# Patient Record
Sex: FEMALE | Race: WHITE | NOT HISPANIC OR LATINO | Employment: FULL TIME | URBAN - METROPOLITAN AREA
[De-identification: names, ages, dates, MRNs, and addresses within clinical notes are randomized per-mention and may not be internally consistent; named-entity substitution may affect disease eponyms.]

---

## 2020-04-29 RX ORDER — BUSPIRONE HYDROCHLORIDE 7.5 MG/1
7.5 TABLET ORAL 3 TIMES DAILY
COMMUNITY

## 2020-04-29 RX ORDER — TIZANIDINE 4 MG/1
4 TABLET ORAL NIGHTLY PRN
COMMUNITY

## 2020-04-29 RX ORDER — MONTELUKAST SODIUM 10 MG/1
10 TABLET ORAL NIGHTLY
COMMUNITY

## 2020-04-29 RX ORDER — TRAMADOL HYDROCHLORIDE 50 MG/1
50 TABLET ORAL EVERY 6 HOURS PRN
COMMUNITY
End: 2020-04-30

## 2020-04-29 RX ORDER — IBUPROFEN 800 MG/1
800 TABLET ORAL EVERY 6 HOURS PRN
COMMUNITY

## 2020-04-29 RX ORDER — HYDROCHLOROTHIAZIDE 25 MG/1
25 TABLET ORAL DAILY
COMMUNITY

## 2020-04-30 ENCOUNTER — OFFICE VISIT (OUTPATIENT)
Dept: NEUROSURGERY | Facility: CLINIC | Age: 40
End: 2020-04-30

## 2020-04-30 VITALS — WEIGHT: 188.8 LBS | RESPIRATION RATE: 15 BRPM | BODY MASS INDEX: 28.61 KG/M2 | TEMPERATURE: 96 F | HEIGHT: 68 IN

## 2020-04-30 DIAGNOSIS — M54.16 LUMBAR RADICULOPATHY: Primary | ICD-10-CM

## 2020-04-30 PROCEDURE — 99204 OFFICE O/P NEW MOD 45 MIN: CPT | Performed by: NEUROLOGICAL SURGERY

## 2020-04-30 NOTE — PROGRESS NOTES
NAME: SANAZ VELEZ   DOS: 2020  : 1980  PCP: Muna Yanes APRN    Chief Complaint:    Chief Complaint   Patient presents with   • Low back & left hip pain       History of Present Illness:  40 y.o. female   I saw this 40-year-old female in neurosurgical consultation she reports a history of back issues after the birth of her first child some 14 years ago she has pain in her buttock hip and left leg and went to the emergency room and followed up with her primary care doctor she is a daily smoker she has pain that radiates into her foot gets numb she denies any bowel bladder incontinence issues or new symptoms of cauda equina syndrome she is been to the chiropractor and is here for evaluation    PMHX  Allergies:  No Known Allergies  Medications    Current Outpatient Medications:   •  busPIRone (BUSPAR) 7.5 MG tablet, Take 7.5 mg by mouth 3 (Three) Times a Day., Disp: , Rfl:   •  hydroCHLOROthiazide (HYDRODIURIL) 25 MG tablet, Take 25 mg by mouth Daily., Disp: , Rfl:   •  ibuprofen (ADVIL,MOTRIN) 800 MG tablet, Take 800 mg by mouth Every 6 (Six) Hours As Needed for Mild Pain ., Disp: , Rfl:   •  montelukast (SINGULAIR) 10 MG tablet, Take 10 mg by mouth Every Night., Disp: , Rfl:   •  tiZANidine (ZANAFLEX) 4 MG tablet, Take 4 mg by mouth At Night As Needed for Muscle Spasms., Disp: , Rfl:   Past Medical History:  Past Medical History:   Diagnosis Date   • Cancer (CMS/HCC)     Ovarian cancer     Past Surgical History:  Past Surgical History:   Procedure Laterality Date   • HYSTERECTOMY           Social Hx:  Social History     Tobacco Use   • Smoking status: Not on file   Substance Use Topics   • Alcohol use: Not on file   • Drug use: Never     Family Hx:  History reviewed. No pertinent family history.  Review of Systems:        Review of Systems   Constitutional: Negative for activity change, appetite change, chills, diaphoresis, fatigue, fever and unexpected weight change.   HENT:  Negative for congestion, dental problem, drooling, ear discharge, ear pain, facial swelling, hearing loss, mouth sores, nosebleeds, postnasal drip, rhinorrhea, sinus pressure, sneezing, sore throat, tinnitus, trouble swallowing and voice change.    Eyes: Negative for photophobia, pain, discharge, redness, itching and visual disturbance.   Respiratory: Negative for apnea, cough, choking, chest tightness, shortness of breath, wheezing and stridor.    Cardiovascular: Negative for chest pain, palpitations and leg swelling.   Gastrointestinal: Negative for abdominal distention, abdominal pain, anal bleeding, blood in stool, constipation, diarrhea, nausea, rectal pain and vomiting.   Endocrine: Negative for cold intolerance, heat intolerance, polydipsia, polyphagia and polyuria.   Genitourinary: Negative for decreased urine volume, difficulty urinating, dysuria, enuresis, flank pain, frequency, genital sores, hematuria and urgency.   Musculoskeletal: Positive for back pain. Negative for arthralgias, gait problem, joint swelling, myalgias, neck pain and neck stiffness.   Skin: Negative for color change, pallor, rash and wound.   Allergic/Immunologic: Negative for environmental allergies, food allergies and immunocompromised state.   Neurological: Negative for dizziness, tremors, seizures, syncope, facial asymmetry, speech difficulty, weakness, light-headedness, numbness and headaches.   Hematological: Negative for adenopathy. Does not bruise/bleed easily.   Psychiatric/Behavioral: Negative for agitation, behavioral problems, confusion, decreased concentration, dysphoric mood, hallucinations, self-injury, sleep disturbance and suicidal ideas. The patient is not nervous/anxious and is not hyperactive.    All other systems reviewed and are negative.     Insert review of systems significant for ovarian cancer reportedly up-to-date      Physical Examination:  Vitals:    04/30/20 1159   Resp: 15   Temp: 96 °F (35.6 °C)       General Appearance:   Well developed, well nourished, well groomed, alert, and cooperative.  Neurological examination:  Neurologic Exam  Vital signs were reviewed and documented in the chart  Patient appeared in good neurologic function with normal comprehension fluent speech  Mood and affect are normal  Sense of smell deferred    Pupils symmetric equally reactive funduscopic exam not visualized   Visual fields intact to confrontation  Extraocular movements intact  Face motor function is symmetric  Facial sensations normal  Hearing intact to finger rub hearing intact to finger rub  Tongue is midline  Palate symmetric  Swallowing normal  Shoulder shrug normal    Muscle bulk and tone normal  5 out of 5 strength no motor drift, effort dependent left EHL could be 4+ out of 5 positive straight leg raise sign left  Gait normal intact  Negative Romberg  No clonus long tract signs or myelopathy  Arthritic movements in the back tattoo present no lesions  Reflexes symmetric particular at the knees and ankles  No edema noted and extremities skin appears normal    Straight leg raise sign absent on the right  No signs of intrinsic hip dysfunction however she has bilaterally arthritic  Back is without any lesions or abnormality  Feet are warm and well perfused        Review of Imaging/DATA:  MRI report was reviewed films not available that discloses the presence of a large left L4-5 disc herniation  Diagnoses/Plan:    Ms. Sahu is a 40 y.o. female   1.  Subacute left-sided L4-5 disc rupture with subtle improvement history of chronic axial back issues but nothing to this nature from my standpoint she is reporting some improvement she is able to walk she has not had pain medicines today she does have subtle EHL weakness on the left side and she is 2 weeks    From my standpoint we need to review the MRI personally but based on the MRI reports and pain pathway if this persists more than 3 or 4 more weeks she would be a candidate  for a left L4-5 lumbar discectomy I explained the signs and symptoms to look for to necessitate a referral back I reported that I be happy to review the films and set up a video visit as well and that if her symptoms became recalcitrant we could contemplate scheduling her for surgery.  We will make arrangements accordingly for follow-up in about a month and sooner if symptoms persist    I explained the risk benefits and expected outcomes from a surgical procedure explained the risk of complications surgical recovery etc. I spent 30 minutes with her face-to-face and additional time with her regarding complications outcome and reviewing her studies contemplating surgical risk etc.

## 2020-06-08 ENCOUNTER — TELEPHONE (OUTPATIENT)
Dept: NEUROSURGERY | Facility: CLINIC | Age: 40
End: 2020-06-08

## 2020-06-08 NOTE — TELEPHONE ENCOUNTER
Dr. Cyr attempted to call pt/ x3 on Thursday the 4th with no answer.    I tried today again with no answer.    Tried reaching out to pt's employer to check on her. No answer, no option for VM.

## 2023-04-07 PROBLEM — R60.0 BILATERAL LEG EDEMA: Status: ACTIVE | Noted: 2023-04-07

## 2023-04-07 NOTE — PROGRESS NOTES
OFFICE VISIT  NOTE  [facility] CARDIOLOGY [place]      Name: Landy Sahu    Date: 4/10/2023  MRN:  [id number]  :  1980      REFERRING/PRIMARY PROVIDER:  Muna Yanes APRN    Chief Complaint   Patient presents with   • Edema     Re Eval       HPI: Landy Sahu is a 42 y.o. female who presents today for new consultation for edema.  History of ovarian cancer as a child last chemotherapy was when she was 14 years old regiment included bleomycin, etoposide, and cisplatin.  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  She also has spider veins, some swelling behind her left knee, bilateral lower extremity venous duplex 3/2023 normal.  She smokes 1 pack/day, has not tried to quit not interested at this time.  Does get some chest heaviness when she lays down at night but not when she exerts herself denies shortness of breath.    Past Medical History:   Diagnosis Date   • Anxiety    • Arthritis    • Cancer     Ovarian cancer   • Easy bruising    • Heart murmur        Past Surgical History:   Procedure Laterality Date   • HYSTERECTOMY      2018   • OVARY SURGERY         Social History     Socioeconomic History   • Marital status:    Tobacco Use   • Smoking status: Every Day     Types: Cigarettes   Substance and Sexual Activity   • Alcohol use: Not Currently   • Drug use: Defer   • Sexual activity: Defer       Family History   Problem Relation Age of Onset   • Leukemia Mother    • Cirrhosis Father    • Lymphoma Father         ROS:   Constitutional no fever,  no weight loss   Skin no rash, no subcutaneous nodules   Otolaryngeal no difficulty swallowing   Cardiovascular See HPI   Pulmonary no cough, no sputum production   Gastrointestinal no constipation, no diarrhea   Genitourinary no dysuria, no hematuria   Hematologic no easy bruisability, no abnormal bleeding  "  Musculoskeletal no muscle pain   Neurologic no dizziness, no falls         No Known Allergies      Current Outpatient Medications:   •  Allegra-D Allergy & Congestion 180-240 MG per 24 hr tablet, Take 1 tablet by mouth Daily., Disp: , Rfl:   •  diazePAM (VALIUM) 5 MG tablet, TAKE 1 TABLET BY MOUTH EVERY 12 HOURS AS NEEDED FOR SEVERE ANXIETY ATTACK - DO NOT MIX WITH PAIN MEDS, Disp: , Rfl:   •  hydroCHLOROthiazide (HYDRODIURIL) 25 MG tablet, Take 2 tablets by mouth Daily., Disp: , Rfl:   •  ibuprofen (ADVIL,MOTRIN) 800 MG tablet, Take 1 tablet by mouth Every 6 (Six) Hours As Needed for Mild Pain., Disp: , Rfl:   •  levoFLOXacin (LEVAQUIN) 500 MG tablet, Take 1 tablet by mouth Daily., Disp: , Rfl:   •  Omega 3-6-9 Fatty Acids (OMEGA-3-6-9 PO), Take 1,000 mg by mouth Daily., Disp: , Rfl:   •  potassium chloride ER (K-TAB) 20 MEQ tablet controlled-release ER tablet, Take 1 tablet by mouth Daily., Disp: , Rfl:   •  tiZANidine (ZANAFLEX) 4 MG tablet, Take 1 tablet by mouth At Night As Needed for Muscle Spasms., Disp: , Rfl:   •  traMADol (ULTRAM) 50 MG tablet, Take 25 mg by mouth Every 6 (Six) Hours As Needed. for pain, Disp: , Rfl:   •  vitamin D3 125 MCG (5000 UT) capsule capsule, Take 1 capsule by mouth Daily., Disp: , Rfl:     Vitals:    04/10/23 0856   BP: 118/68   BP Location: Right arm   Patient Position: Sitting   Pulse: 77   SpO2: 98%   Weight: 79.7 kg (175 lb 12.8 oz)   Height: 175.3 cm (69\")     Body mass index is 25.96 kg/m².    PHYSICAL EXAM:    General Appearance:   · well developed  · well nourished  HENT:   · oropharynx moist  · lips not cyanotic  Neck:  · thyroid not enlarged  · supple  Respiratory:  · no respiratory distress  · normal breath sounds  · no rales  Cardiovascular:  · no jugular venous distention  · regular rhythm  · apical impulse normal  · S1 normal, S2 normal  · no S3, no S4   · no murmur  · no rub, no thrill  · carotid pulses normal; no bruit  · lower extremity edema: none  "     Musculoskeletal:  · no clubbing of fingers.   · normocephalic, head atraumatic  Skin:   · warm, dry  Psychiatric:  · judgement and insight appropriate  · normal mood and affect    RESULTS:     ECG 12 Lead    Date/Time: 4/10/2023 9:29 AM  Performed by: Adria Bullock MD  Authorized by: Adria Bullock MD   Comparison: not compared with previous ECG   Previous ECG: no previous ECG available  Rhythm: sinus rhythm  Rate: normal  QRS axis: normal    Clinical impression: normal ECG                  Labs:  CBC 1/26/23   RBC 4.42   WBC 10.3   Hemoglobin 13.2   Hematocrit 38.2   Platelet 301       CMP 1/26/23   Glucose 81   BUN 11   Creatinine 0.68   Glomerular Filtration Rate 112   BUN/Creatinine Ratio 16   Sodium 139   Potassium 3.5   Chloride 98   Carbon Dioxide 25   Calcium 9.8   AST 32   ALT 35 H       Lipid Panel 1/26/23   Total 261 H   HDL 50   Triglycerides 144    H       TSH 2.250   Free T4 1.43       Most recent PCP note, imaging tests, and labs reviewed.    ASSESSMENT:  Problem List Items Addressed This Visit        Symptoms and Signs    Bilateral leg edema - Primary       PLAN:    1.  Lower extremity edema:  Sounds consistent with venous insufficiency, it is dependent.  However given risk factors including previous chemotherapy and ongoing smoking we will check echocardiogram  Low-sodium diet recommend  Compression stockings recommended  Elevation when possible    2.  Hyperlipidemia:  Lipids improved from -157 most recently, decrease saturated fat intake  Smoking cessation recommend  No statin required at this time unless coronary disease found    3.  Chest pain:  Treadmill stress test and echocardiogram ordered    4.  Tobacco abuse:  Discussed importance complete tobacco cessation, assistance was offered but she declined at this time.          Return to clinic in 6 months, or sooner as needed.    Thank you for the opportunity to share in the care of your patient; please do not hesitate to  call me with any questions.     Adria Bullock MD, FACC, [facility]  Office: [phone] [address] [place] [address]    04/10/23

## 2023-04-10 ENCOUNTER — OFFICE VISIT (OUTPATIENT)
Dept: CARDIOLOGY | Facility: CLINIC | Age: 43
End: 2023-04-10
Payer: COMMERCIAL

## 2023-04-10 VITALS
HEIGHT: 69 IN | WEIGHT: 175.8 LBS | OXYGEN SATURATION: 98 % | BODY MASS INDEX: 26.04 KG/M2 | SYSTOLIC BLOOD PRESSURE: 118 MMHG | DIASTOLIC BLOOD PRESSURE: 68 MMHG | HEART RATE: 77 BPM

## 2023-04-10 DIAGNOSIS — R60.0 BILATERAL LEG EDEMA: Primary | ICD-10-CM

## 2023-04-10 RX ORDER — LEVOFLOXACIN 500 MG/1
500 TABLET, FILM COATED ORAL DAILY
COMMUNITY

## 2023-04-10 RX ORDER — FEXOFENADINE HYDROCHLORIDE AND PSEUDOEPHEDRINE HYDROCHLORIDE 180; 240 MG/1; MG/1
1 TABLET, FILM COATED, EXTENDED RELEASE ORAL DAILY
COMMUNITY
Start: 2023-01-05

## 2023-04-10 RX ORDER — TRAMADOL HYDROCHLORIDE 50 MG/1
25 TABLET ORAL EVERY 6 HOURS PRN
COMMUNITY
Start: 2023-03-22

## 2023-04-10 RX ORDER — POTASSIUM CHLORIDE 1500 MG/1
1 TABLET, FILM COATED, EXTENDED RELEASE ORAL DAILY
COMMUNITY
Start: 2023-02-01

## 2023-04-10 RX ORDER — ROSUVASTATIN CALCIUM 20 MG/1
20 TABLET, COATED ORAL DAILY
COMMUNITY
Start: 2023-02-20 | End: 2023-04-10

## 2023-04-10 RX ORDER — DIAZEPAM 5 MG/1
TABLET ORAL
COMMUNITY
Start: 2023-02-20

## 2023-04-12 ENCOUNTER — TELEPHONE (OUTPATIENT)
Dept: CARDIOLOGY | Facility: CLINIC | Age: 43
End: 2023-04-12
Payer: COMMERCIAL

## 2023-04-27 ENCOUNTER — TELEPHONE (OUTPATIENT)
Dept: CARDIOLOGY | Facility: CLINIC | Age: 43
End: 2023-04-27
Payer: COMMERCIAL

## 2023-04-27 NOTE — TELEPHONE ENCOUNTER
----- Message from Adria Bullock MD sent at 4/26/2023  2:17 PM EDT -----  Please inform the patient of their test results. Thank you.

## 2023-04-28 ENCOUNTER — TELEPHONE (OUTPATIENT)
Dept: CARDIOLOGY | Facility: CLINIC | Age: 43
End: 2023-04-28
Payer: COMMERCIAL

## 2023-04-28 NOTE — TELEPHONE ENCOUNTER
----- Message from Adria Bullock MD sent at 4/28/2023 12:52 PM EDT -----  Please inform the patient of their test results. Thank you.

## 2025-08-13 ENCOUNTER — OFFICE VISIT (OUTPATIENT)
Dept: CARDIOLOGY | Facility: CLINIC | Age: 45
End: 2025-08-13
Payer: COMMERCIAL

## 2025-08-13 VITALS
OXYGEN SATURATION: 97 % | WEIGHT: 187.6 LBS | DIASTOLIC BLOOD PRESSURE: 78 MMHG | HEART RATE: 57 BPM | HEIGHT: 68 IN | BODY MASS INDEX: 28.43 KG/M2 | SYSTOLIC BLOOD PRESSURE: 122 MMHG

## 2025-08-13 DIAGNOSIS — R07.9 CHEST PAIN, UNSPECIFIED TYPE: ICD-10-CM

## 2025-08-13 DIAGNOSIS — R60.0 BILATERAL LEG EDEMA: Primary | ICD-10-CM

## 2025-08-13 DIAGNOSIS — E78.5 HYPERLIPIDEMIA LDL GOAL <70: ICD-10-CM

## 2025-08-13 DIAGNOSIS — R07.2 PRECORDIAL CHEST PAIN: ICD-10-CM

## 2025-08-13 DIAGNOSIS — R07.2 PRECORDIAL CHEST PAIN: Primary | ICD-10-CM

## 2025-08-13 PROBLEM — Z85.43 HISTORY OF OVARIAN CANCER: Status: ACTIVE | Noted: 2025-08-13

## 2025-08-13 PROBLEM — Z72.0 TOBACCO ABUSE: Status: ACTIVE | Noted: 2025-08-13

## 2025-08-13 PROCEDURE — 93000 ELECTROCARDIOGRAM COMPLETE: CPT | Performed by: INTERNAL MEDICINE

## 2025-08-13 PROCEDURE — 99214 OFFICE O/P EST MOD 30 MIN: CPT | Performed by: INTERNAL MEDICINE

## 2025-08-13 RX ORDER — NITROGLYCERIN 0.4 MG/1
0.8 TABLET SUBLINGUAL
OUTPATIENT
Start: 2025-08-13

## 2025-08-13 RX ORDER — NITROGLYCERIN 0.4 MG/1
0.4 TABLET SUBLINGUAL
OUTPATIENT
Start: 2025-08-13 | End: 2025-08-13

## 2025-08-13 RX ORDER — SODIUM CHLORIDE 0.9 % (FLUSH) 0.9 %
10 SYRINGE (ML) INJECTION AS NEEDED
OUTPATIENT
Start: 2025-08-13

## 2025-08-13 RX ORDER — LEVOCETIRIZINE DIHYDROCHLORIDE 5 MG/1
1 TABLET, FILM COATED ORAL DAILY
COMMUNITY
Start: 2025-08-01

## 2025-08-13 RX ORDER — METOPROLOL TARTRATE 50 MG
50 TABLET ORAL
OUTPATIENT
Start: 2025-08-13

## 2025-08-13 RX ORDER — SODIUM CHLORIDE 0.9 % (FLUSH) 0.9 %
10 SYRINGE (ML) INJECTION EVERY 12 HOURS SCHEDULED
OUTPATIENT
Start: 2025-08-13

## 2025-08-13 RX ORDER — SODIUM CHLORIDE 9 MG/ML
40 INJECTION, SOLUTION INTRAVENOUS AS NEEDED
OUTPATIENT
Start: 2025-08-13

## 2025-08-13 RX ORDER — METOPROLOL TARTRATE 25 MG/1
100 TABLET, FILM COATED ORAL ONCE
OUTPATIENT
Start: 2025-08-13

## 2025-08-13 RX ORDER — IVABRADINE 5 MG/1
15 TABLET, FILM COATED ORAL ONCE
OUTPATIENT
Start: 2025-08-13 | End: 2025-08-13

## 2025-08-13 RX ORDER — METOPROLOL TARTRATE 25 MG/1
50 TABLET, FILM COATED ORAL ONCE
OUTPATIENT
Start: 2025-08-13

## 2025-08-13 RX ORDER — METOPROLOL TARTRATE 25 MG/1
150 TABLET, FILM COATED ORAL ONCE
OUTPATIENT
Start: 2025-08-13

## 2025-08-13 RX ORDER — METOPROLOL TARTRATE 25 MG/1
200 TABLET, FILM COATED ORAL ONCE
OUTPATIENT
Start: 2025-08-13 | End: 2025-08-13

## 2025-08-13 RX ORDER — METOPROLOL TARTRATE 1 MG/ML
5 INJECTION, SOLUTION INTRAVENOUS
OUTPATIENT
Start: 2025-08-13

## 2025-08-13 RX ORDER — ESTRADIOL 1 MG/1
1 TABLET ORAL DAILY
COMMUNITY
Start: 2025-07-08

## 2025-08-25 ENCOUNTER — TELEPHONE (OUTPATIENT)
Dept: CARDIOLOGY | Facility: CLINIC | Age: 45
End: 2025-08-25
Payer: COMMERCIAL

## 2025-08-26 ENCOUNTER — RESULTS FOLLOW-UP (OUTPATIENT)
Dept: CARDIOLOGY | Facility: CLINIC | Age: 45
End: 2025-08-26
Payer: COMMERCIAL